# Patient Record
Sex: MALE | Race: WHITE | NOT HISPANIC OR LATINO | URBAN - METROPOLITAN AREA
[De-identification: names, ages, dates, MRNs, and addresses within clinical notes are randomized per-mention and may not be internally consistent; named-entity substitution may affect disease eponyms.]

---

## 2017-05-10 ENCOUNTER — OUTPATIENT (OUTPATIENT)
Dept: OUTPATIENT SERVICES | Facility: HOSPITAL | Age: 32
LOS: 1 days | End: 2017-05-10

## 2017-05-10 ENCOUNTER — APPOINTMENT (OUTPATIENT)
Age: 32
End: 2017-05-10

## 2017-05-10 DIAGNOSIS — Z00.00 ENCOUNTER FOR GENERAL ADULT MEDICAL EXAMINATION WITHOUT ABNORMAL FINDINGS: ICD-10-CM

## 2019-05-03 ENCOUNTER — OFFICE VISIT (OUTPATIENT)
Dept: FAMILY MEDICINE | Facility: CLINIC | Age: 34
End: 2019-05-03

## 2019-05-03 VITALS — WEIGHT: 227 LBS | BODY MASS INDEX: 30.09 KG/M2 | HEIGHT: 73 IN

## 2019-05-03 DIAGNOSIS — B07.8 OTHER VIRAL WARTS: Primary | ICD-10-CM

## 2019-05-03 PROCEDURE — 17110 DESTRUCTION B9 LES UP TO 14: CPT | Performed by: PHYSICIAN ASSISTANT

## 2019-05-03 SDOH — HEALTH STABILITY: MENTAL HEALTH: HOW OFTEN DO YOU HAVE A DRINK CONTAINING ALCOHOL?: MONTHLY OR LESS

## 2019-05-03 SDOH — HEALTH STABILITY: MENTAL HEALTH: HOW MANY STANDARD DRINKS CONTAINING ALCOHOL DO YOU HAVE ON A TYPICAL DAY?: 1 OR 2

## 2019-05-03 ASSESSMENT — MIFFLIN-ST. JEOR: SCORE: 2023.55

## 2019-05-03 NOTE — PROGRESS NOTES
Chief Complaint   Patient presents with     Wart     right hand 3rd digit       SUBJECTIVE:   Abdoulaye Terry who presents today for wart removal.      Previous treatments include: OTC meds and duct tape  The cause of warts and risks and benefits of   liquid nitrogen therapy, including scarring, pigment changes, and   wart recurrence were discussed with the patient.    OBJECTIVE:    Hyperkeratotic plaque with thrombosed capillaries.   Location: right hand, posterior surface of third digit between DIP and PIP joints  Size: 5 mm in diameter    Cleansed with alcohol swab. Pared down to expose roots with 15 blade. Minimal bleeding occurred. Completed 3, 30 second freeze thaw cycles. Applied thin film of bacitracin ointment and covered with a bandage. Patient tolerated well.     ASSESSMENT:   1. Other viral warts          PLAN:   Discussed viral cause, and transmission.   Patient was instructed in the changes that would take place after treatment with liquid nitrogen and to call for any questions. If signs of infection should return to clinic.    Keep dry and covered with bacitracin ointment and bandage for 24 hours.  Then keep covered with bacitracin ointment for another 48 hours.  Use tylenol or ibuprofen as needed for discomfort.   Lesions will get red, swollen, and scab. After it has healed including most of scab apply compound W (salicylic acid)  Return to clinic in 3-4 weeks for re-treatment if not resolved.    Jennifer Heard PA-C  5/3/2019

## 2019-05-03 NOTE — NURSING NOTE
Abdoulaye is here for wart treatment    Pre-visit Screening:  Immunizations:  up to date  Colonoscopy:  NA  Mammogram: NA  Asthma Action Test/Plan:  NA  PHQ9:  NA  GAD7:  NA  Questioned patient about current smoking habits Pt. has never smoked.  Ok to leave detailed message on voice mail for today's visit only Yes, phone # [phone]

## 2023-05-24 ENCOUNTER — MEDICAL CORRESPONDENCE (OUTPATIENT)
Dept: HEALTH INFORMATION MANAGEMENT | Facility: CLINIC | Age: 38
End: 2023-05-24
Payer: COMMERCIAL

## 2023-06-07 ENCOUNTER — LAB REQUISITION (OUTPATIENT)
Dept: LAB | Facility: CLINIC | Age: 38
End: 2023-06-07

## 2023-06-07 ENCOUNTER — APPOINTMENT (OUTPATIENT)
Dept: LAB | Facility: CLINIC | Age: 38
End: 2023-06-07

## 2023-06-07 DIAGNOSIS — Z00.00 ENCOUNTER FOR GENERAL ADULT MEDICAL EXAMINATION WITHOUT ABNORMAL FINDINGS: ICD-10-CM

## 2023-06-07 PROCEDURE — 86481 TB AG RESPONSE T-CELL SUSP: CPT | Performed by: ORTHOPAEDIC SURGERY

## 2023-06-07 PROCEDURE — 36415 COLL VENOUS BLD VENIPUNCTURE: CPT | Performed by: ORTHOPAEDIC SURGERY

## 2023-06-10 LAB
QUANTIFERON MITOGEN: 10 IU/ML
QUANTIFERON NIL TUBE: 0.52 IU/ML
QUANTIFERON TB1 TUBE: 0.48 IU/ML
QUANTIFERON TB2 TUBE: 0.62

## 2023-06-11 LAB
GAMMA INTERFERON BACKGROUND BLD IA-ACNC: 0.52 IU/ML
M TB IFN-G BLD-IMP: NEGATIVE
M TB IFN-G CD4+ BCKGRND COR BLD-ACNC: 9.48 IU/ML
MITOGEN IGNF BCKGRD COR BLD-ACNC: -0.04 IU/ML
MITOGEN IGNF BCKGRD COR BLD-ACNC: 0.1 IU/ML